# Patient Record
Sex: MALE | Race: BLACK OR AFRICAN AMERICAN | NOT HISPANIC OR LATINO | Employment: UNEMPLOYED | URBAN - METROPOLITAN AREA
[De-identification: names, ages, dates, MRNs, and addresses within clinical notes are randomized per-mention and may not be internally consistent; named-entity substitution may affect disease eponyms.]

---

## 2022-07-05 ENCOUNTER — HOSPITAL ENCOUNTER (EMERGENCY)
Facility: HOSPITAL | Age: 32
Discharge: HOME | End: 2022-07-05
Attending: EMERGENCY MEDICINE
Payer: MEDICAID

## 2022-07-05 VITALS
BODY MASS INDEX: 30.9 KG/M2 | HEART RATE: 87 BPM | TEMPERATURE: 98 F | OXYGEN SATURATION: 99 % | SYSTOLIC BLOOD PRESSURE: 135 MMHG | WEIGHT: 191.5 LBS | RESPIRATION RATE: 16 BRPM | DIASTOLIC BLOOD PRESSURE: 68 MMHG

## 2022-07-05 DIAGNOSIS — Z20.2 STD EXPOSURE: Primary | ICD-10-CM

## 2022-07-05 LAB
HCV AB SERPL QL IA: NEGATIVE
HEP C VIRUS HOLD SPECIMEN: NORMAL
HIV 1+2 AB+HIV1 P24 AG SERPL QL IA: NEGATIVE

## 2022-07-05 PROCEDURE — 87591 N.GONORRHOEAE DNA AMP PROB: CPT | Performed by: EMERGENCY MEDICINE

## 2022-07-05 PROCEDURE — 87491 CHLMYD TRACH DNA AMP PROBE: CPT | Performed by: EMERGENCY MEDICINE

## 2022-07-05 PROCEDURE — 86803 HEPATITIS C AB TEST: CPT | Performed by: EMERGENCY MEDICINE

## 2022-07-05 PROCEDURE — 87389 HIV-1 AG W/HIV-1&-2 AB AG IA: CPT | Performed by: EMERGENCY MEDICINE

## 2022-07-05 PROCEDURE — 99283 EMERGENCY DEPT VISIT LOW MDM: CPT | Mod: 25,ER

## 2022-07-05 NOTE — ED PROVIDER NOTES
"Encounter Date: 7/5/2022       History     Chief Complaint   Patient presents with    Exposure to STD     "Just want checked for STD's" denies exposure, denies symptoms     The history is provided by the patient.   Exposure to STD  This is a new problem. The current episode started more than 1 week ago. The problem has not changed since onset.Pertinent negatives include no chest pain, no abdominal pain, no headaches and no shortness of breath. Nothing aggravates the symptoms. Nothing relieves the symptoms.     Review of patient's allergies indicates:  No Known Allergies  No past medical history on file.  No past surgical history on file.  No family history on file.  Social History     Tobacco Use    Smoking status: Current Every Day Smoker     Packs/day: 0.50     Types: Cigars   Substance Use Topics    Alcohol use: No    Drug use: No     Review of Systems   Constitutional: Negative for fever.   HENT: Negative for sore throat.    Respiratory: Negative for shortness of breath.    Cardiovascular: Negative for chest pain.   Gastrointestinal: Negative for abdominal pain and nausea.   Genitourinary: Negative for dysuria.   Musculoskeletal: Negative for back pain.   Skin: Negative for rash.   Neurological: Negative for weakness and headaches.   Hematological: Does not bruise/bleed easily.       Physical Exam     Initial Vitals [07/05/22 1038]   BP Pulse Resp Temp SpO2   135/68 87 16 98.2 °F (36.8 °C) 99 %      MAP       --         Physical Exam    Nursing note and vitals reviewed.  Constitutional: He appears well-developed and well-nourished. No distress.   HENT:   Head: Normocephalic and atraumatic.   Mouth/Throat: Oropharynx is clear and moist.   Eyes: Conjunctivae and EOM are normal. Pupils are equal, round, and reactive to light.   Neck: Neck supple.   Normal range of motion.  Cardiovascular: Normal rate, regular rhythm and normal heart sounds. Exam reveals no gallop and no friction rub.    No murmur " heard.  Pulmonary/Chest: Breath sounds normal. No respiratory distress. He has no wheezes. He has no rhonchi. He has no rales.   Abdominal: Abdomen is soft. Bowel sounds are normal. He exhibits no distension and no mass. There is no abdominal tenderness. There is no rebound and no guarding.   Musculoskeletal:         General: No edema. Normal range of motion.      Cervical back: Normal range of motion and neck supple.     Neurological: He is alert and oriented to person, place, and time. He has normal strength.   Skin: Skin is warm and dry. No rash noted.   Psychiatric: He has a normal mood and affect. Thought content normal.         ED Course   Procedures  Labs Reviewed   C. TRACHOMATIS/N. GONORRHOEAE BY AMP DNA   HIV 1 / 2 ANTIBODY   HEPATITIS C ANTIBODY   HEP C VIRUS HOLD SPECIMEN          Imaging Results    None          Medications - No data to display                       Clinical Impression:   Final diagnoses:  [Z20.2] STD exposure (Primary)          ED Disposition Condition    Discharge Stable        ED Prescriptions     None        Follow-up Information     Follow up With Specialties Details Why Contact [facility]  Call in 1 day  [address]  [place] [address]  [phone]             Cholo Tirado MD  07/05/22 7158

## 2022-07-06 LAB
C TRACH DNA SPEC QL NAA+PROBE: DETECTED
N GONORRHOEA DNA SPEC QL NAA+PROBE: NOT DETECTED

## 2022-07-08 ENCOUNTER — TELEPHONE (OUTPATIENT)
Dept: EMERGENCY MEDICINE | Facility: HOSPITAL | Age: 32
End: 2022-07-08
Payer: MEDICAID

## 2022-07-08 NOTE — TELEPHONE ENCOUNTER
----- Message from Levi Montanez MD sent at 7/7/2022  4:04 AM CDT -----  This patient tested positive for chlamydia. Please call patient and inform him. Please call in a prescription to the pharmacy of his choice. Prescription for 100 mg doxycyline BID x 7 days. 14 tabs. 0 refills. He should notify his sexual partners and have them tested/treated as well

## 2025-07-19 ENCOUNTER — HOSPITAL ENCOUNTER (EMERGENCY)
Facility: HOSPITAL | Age: 35
Discharge: HOME | End: 2025-07-19
Attending: EMERGENCY MEDICINE

## 2025-07-19 VITALS
HEART RATE: 89 BPM | WEIGHT: 195.13 LBS | BODY MASS INDEX: 30.63 KG/M2 | RESPIRATION RATE: 18 BRPM | OXYGEN SATURATION: 98 % | TEMPERATURE: 99 F | DIASTOLIC BLOOD PRESSURE: 69 MMHG | HEIGHT: 67 IN | SYSTOLIC BLOOD PRESSURE: 133 MMHG

## 2025-07-19 DIAGNOSIS — N45.1 EPIDIDYMITIS, LEFT: Primary | ICD-10-CM

## 2025-07-19 DIAGNOSIS — N50.812 LEFT TESTICULAR PAIN: ICD-10-CM

## 2025-07-19 DIAGNOSIS — N50.812 PAIN IN LEFT TESTICLE: ICD-10-CM

## 2025-07-19 LAB
BILIRUB UR QL STRIP.AUTO: NEGATIVE
CLARITY UR: CLEAR
COLOR UR AUTO: YELLOW
GLUCOSE UR QL STRIP: NEGATIVE
HGB UR QL STRIP: NEGATIVE
KETONES UR QL STRIP: NEGATIVE
LEUKOCYTE ESTERASE UR QL STRIP: NEGATIVE
NITRITE UR QL STRIP: NEGATIVE
PH UR STRIP: 7 [PH]
PROT UR QL STRIP: ABNORMAL
SP GR UR STRIP: 1.02
UROBILINOGEN UR STRIP-ACNC: 1 EU/DL

## 2025-07-19 PROCEDURE — 81003 URINALYSIS AUTO W/O SCOPE: CPT | Mod: ER | Performed by: EMERGENCY MEDICINE

## 2025-07-19 PROCEDURE — 87591 N.GONORRHOEAE DNA AMP PROB: CPT | Performed by: EMERGENCY MEDICINE

## 2025-07-19 PROCEDURE — 99285 EMERGENCY DEPT VISIT HI MDM: CPT | Mod: 25,ER

## 2025-07-19 RX ORDER — CIPROFLOXACIN 500 MG/1
500 TABLET, FILM COATED ORAL 2 TIMES DAILY
Qty: 20 TABLET | Refills: 0 | Status: SHIPPED | OUTPATIENT
Start: 2025-07-19 | End: 2025-07-29

## 2025-07-19 NOTE — Clinical Note
"Belkis Randolph (Macarthur)erson was seen and treated in our emergency department on 7/19/2025.  He may return to work on 07/21/2025.       If you have any questions or concerns, please don't hesitate to call.       RN    "

## 2025-07-19 NOTE — ED PROVIDER NOTES
SCRIBE #1 NOTE: I, Joni Gutierrez, am scribing for, and in the presence of, Ann Heller MD. I have scribed the entire note.       History     Chief Complaint   Patient presents with    Testicle Pain     Swelling and pain to left testicle that began 2 days ago.     Review of patient's allergies indicates:  No Known Allergies      History of Present Illness     HPI    7/19/2025, 5:25 PM  History obtained from the patient and medical records      History of Present Illness: Belkis Gamez Jr. is a 34 y.o. male patient who presents to the Emergency Department for evaluation of swelling and pain to left testicle which onset two days ago. Symptoms are constant and moderate in severity. No mitigating or exacerbating factors reported. Patient denies any fever, dysuria, SOB, CP, and all other sxs at this time. No further complaints or concerns at this time.       Arrival mode: Personal Transportation    PCP: Monae, Primary Doctor        Past Medical History:  History reviewed. No pertinent past medical history.    Past Surgical History:  History reviewed. No pertinent surgical history.      Family History:  No family history on file.    Social History:  Social History     Tobacco Use    Smoking status: Every Day     Current packs/day: 0.50     Types: Cigars, Cigarettes    Smokeless tobacco: Not on file   Substance and Sexual Activity    Alcohol use: No    Drug use: No    Sexual activity: Yes     Partners: Female        Review of Systems     Review of Systems   Constitutional:  Negative for chills and fever.   HENT:  Negative for congestion and sore throat.    Respiratory:  Negative for cough and shortness of breath.    Cardiovascular:  Negative for chest pain.   Gastrointestinal:  Negative for abdominal pain, nausea and vomiting.   Genitourinary:  Positive for scrotal swelling (left-sided) and testicular pain (left-sided). Negative for dysuria.   Musculoskeletal:  Negative for back pain.   Skin:  Negative for rash.  "  Neurological:  Negative for dizziness, weakness, light-headedness, numbness and headaches.   Hematological:  Does not bruise/bleed easily.   All other systems reviewed and are negative.       Physical Exam     Initial Vitals [07/19/25 1501]   BP Pulse Resp Temp SpO2   133/69 89 18 98.6 °F (37 °C) 98 %      MAP       --          Physical Exam  Nursing Notes and Vital Signs Reviewed.  Constitutional: Patient is in no acute distress. Well-developed and well-nourished.  Head: Atraumatic. Normocephalic.  Eyes: PERRL. EOM intact. Conjunctivae are not pale. No scleral icterus.  ENT: Mucous membranes are moist. Oropharynx is clear and symmetric.    Neck: Supple. Full ROM. No lymphadenopathy.  Cardiovascular: Regular rate. Regular rhythm. No murmurs, rubs, or gallops. Distal pulses are 2+ and symmetric.  Pulmonary/Chest: No respiratory distress. Clear to auscultation bilaterally. No wheezing or rales.  Abdominal: Soft and non-distended.  There is no tenderness.  No rebound, guarding, or rigidity. Good bowel sounds.  Genitourinary: No CVA tenderness. Swelling and tenderness to left testicle.  Musculoskeletal: Moves all extremities. No obvious deformities. No edema. No calf tenderness.  Skin: Warm and dry.  Neurological:  Alert, awake, and appropriate.  Normal speech.  No acute focal neurological deficits are appreciated.  Psychiatric: Normal affect. Good eye contact. Appropriate in content.       ED Course   Procedures  ED Vital Signs:  Vitals:    07/19/25 1501   BP: 133/69   Pulse: 89   Resp: 18   Temp: 98.6 °F (37 °C)   TempSrc: Oral   SpO2: 98%   Weight: 88.5 kg (195 lb 1.7 oz)   Height: 5' 7" (1.702 m)       Abnormal Lab Results:  Labs Reviewed   URINALYSIS, REFLEX TO URINE CULTURE - Abnormal       Result Value    Color, UA Yellow      Appearance, UA Clear      pH, UA 7.0      Spec Grav UA 1.020      Protein, UA Trace (*)     Glucose, UA Negative      Ketones, UA Negative      Bilirubin, UA Negative      Blood, UA " Negative      Nitrites, UA Negative      Urobilinogen, UA 1.0      Leukocyte Esterase, UA Negative     HEPATITIS C ANTIBODY   HEP C VIRUS HOLD SPECIMEN   HIV 1 / 2 ANTIBODY   C. TRACHOMATIS/N. GONORRHOEAE BY AMP DNA   [place] URINE HOLD        All Lab Results:  Results for orders placed or performed during the hospital encounter of 07/19/25   Urinalysis, Reflex to Urine Culture Urine, Clean Catch    Collection Time: 07/19/25  3:31 PM    Specimen: Urine   Result Value Ref Range    Color, UA Yellow Straw, Marleni, Yellow, Light-Orange    Appearance, UA Clear Clear    pH, UA 7.0 5.0 - 8.0    Spec Grav UA 1.020 1.005 - 1.030    Protein, UA Trace (A) Negative    Glucose, UA Negative Negative    Ketones, UA Negative Negative    Bilirubin, UA Negative Negative    Blood, UA Negative Negative    Nitrites, UA Negative Negative    Urobilinogen, UA 1.0 <2.0 EU/dL    Leukocyte Esterase, UA Negative Negative       Imaging Results:  Imaging Results              US Scrotum And Testicles (Final result)  Result time 07/19/25 17:51:32      Final result by Adriano Martines DO (07/19/25 17:51:32)                   Impression:     Left epididymitis without orchitis.      Finalized on: 7/19/2025 5:51 PM By:  Adriano Martines  [facility]# [id number]      2025-07-19 17:53:39.209     [facility]               Narrative:    EXAM: US SCROTUM AND TESTICLES    CLINICAL HISTORY:   Left testicular pain    TECHNIQUE: Routine testicular ultrasound using color and spectral Doppler.    FINDINGS:    Right testicle: 4.2 x 2.2 x 2.9 cm.  No testicular masses.  Normal arterial and vascular flow.    Left testicle: 3.7 x 2.7 x 2.8 cm.  No testicular masses.  Normal arterial and vascular flow.    Epididymis: Epididymis on the left is mildly edematous with increased vascularity consistent with epididymitis.  Unremarkable on the right.    Hydroceles:  Small hydrocele on the left.    Varicoceles: None    Scrotal skin: Unremarkable                                                   The Emergency Provider reviewed the vital signs and test results, which are outlined above.     ED Discussion         6:04 PM : Reassessed pt at this time. Discussed with patient and/or family/caretaker all pertinent ED information and results. Discussed pt dx and plan of tx. Gave the patient all f/u and return to the ED instructions. All questions and concerns were addressed at this time. Patient and/or family/caretaker expresses understanding of information and instructions, and is comfortable with plan to discharge. Pt is stable for discharge.     I discussed with patient and/or family/caretaker that evaluation in the ED does not suggest any emergent or life threatening medical conditions requiring immediate intervention beyond what was provided in the ED, and I believe patient is safe for discharge. Regardless, an unremarkable evaluation in the ED does not preclude the development or presence of a serious or life threatening condition. As such, I instructed that the patient is to return immediately for any worsening or change in current symptoms.         Medical Decision Making  Amount and/or Complexity of Data Reviewed  Labs: ordered. Decision-making details documented in ED Course.  Radiology: ordered and independent interpretation performed. Decision-making details documented in ED Course.    Risk  Prescription drug management.                ED Medication(s):  Medications - No data to display    New Prescriptions    CIPROFLOXACIN HCL (CIPRO) 500 MG TABLET    Take 1 tablet (500 mg total) by mouth 2 (two) times daily. for 10 days               Scribe Attestation:   Scribe #1: I performed the above scribed service and the documentation accurately describes the services I performed. I attest to the accuracy of the note.     Attending:   Physician Attestation Statement for Scribe #1: I, Ann Heller MD, personally performed the services described in this documentation, as scribed by Joni Gutierrez in  my presence, and it is both accurate and complete.           Clinical Impression       ICD-10-CM ICD-9-CM   1. Epididymitis, left  N45.1 604.90   2. Left testicular pain  N50.812 608.9   3. Pain in left testicle  N50.812 608.9       Disposition:   Disposition: Discharged  Condition: Stable

## 2025-07-19 NOTE — ED PROVIDER NOTES
Encounter Date: 7/19/2025       History     Chief Complaint   Patient presents with    Testicle Pain     Swelling and pain to left testicle that began 2 days ago.     Left testicular pain and swelling over the last 2 or 3 days, possibly onset with minor contusion at work, persistent, worsening, no prior such episode.  No urinary symptoms, no personal history of STD, girlfriend present and without complaints or history of STD.  No urethral discharge, skin lesions, or problems involving the right side.    The history is provided by the patient and a significant other. No  was used.     Review of patient's allergies indicates:  No Known Allergies  History reviewed. No pertinent past medical history.  History reviewed. No pertinent surgical history.  No family history on file.  Social History[1]  Review of Systems   Genitourinary:  Positive for scrotal swelling and testicular pain. Negative for decreased urine volume, difficulty urinating, dysuria, enuresis, flank pain, frequency, genital sores, hematuria, penile discharge, penile pain, penile swelling and urgency.       Physical Exam     Initial Vitals [07/19/25 1501]   BP Pulse Resp Temp SpO2   133/69 89 18 98.6 °F (37 °C) 98 %      MAP       --         Physical Exam    Nursing note and vitals reviewed.  Constitutional: He appears well-developed and well-nourished. No distress.   HENT:   Head: Normocephalic and atraumatic.   Eyes: EOM are normal. Pupils are equal, round, and reactive to light.   Cardiovascular:  Normal rate and regular rhythm.           Pulmonary/Chest: Breath sounds normal. No respiratory distress.   Abdominal: Abdomen is soft. Bowel sounds are normal. He exhibits no distension. There is no abdominal tenderness. There is no rebound and no guarding.   Genitourinary:    Genitourinary Comments: Circumcised normal male anatomy without discharge or lesion.  Right testicle and hemiscrotum unremarkable.  Left testicle significantly  enlarged, tender, firm.  Exam most suggestive of epididymitis.  No other findings.       Neurological: He is alert and oriented to person, place, and time. He has normal strength.         ED Course   Procedures  Labs Reviewed   HEPATITIS C ANTIBODY   HEP C VIRUS HOLD SPECIMEN   HIV 1 / 2 ANTIBODY   URINALYSIS, REFLEX TO URINE CULTURE   C. TRACHOMATIS/N. GONORRHOEAE BY AMP DNA          Imaging Results    None          Medications - No data to display    3:33 PM Counseled patient and significant other in detail regarding the differential, likely current diagnosis, unavailability of ultrasound at this facility.  Placed NPO and send urgently to the [place] facility for ultrasound to rule out testicular torsion although clinically epididymitis is more likely and should torsion be present the likelihood testicular salvage is extremely low.  He understands.  Competent to drive himself by private vehicle for this evaluation.    Medical Decision Making  Two or 3 days of left testicular pain, swelling, enlargement, tenderness.  Exam most likely consistent with epididymitis but can not rule out torsion.  Transferring by private vehicle to [facility] for availability of ultrasound.    Problems Addressed:  Left testicular pain: acute illness or injury    Amount and/or Complexity of Data Reviewed  Labs: ordered. Decision-making details documented in ED Course.  Radiology:  Decision-making details documented in ED Course.    Risk  Decision regarding hospitalization.      Additional MDM:   Differential Diagnosis:   Epididymitis, epididymo-orchitis, testicular torsion, other causes of testicular pain and swelling                                        Clinical Impression:  Final diagnoses:  [N50.812] Left testicular pain (Primary)          ED Disposition Condition    ED to ED Transfer Stable                      [1]   Social History  Tobacco Use    Smoking status: Every Day     Current packs/day: 0.50     Types: Cigars,  Cigarettes   Substance Use Topics    Alcohol use: No    Drug use: No        Brian Kevin MD  07/19/25 7368

## 2025-07-20 LAB — HOLD SPECIMEN: NORMAL

## 2025-07-21 ENCOUNTER — HOSPITAL ENCOUNTER (EMERGENCY)
Facility: HOSPITAL | Age: 35
Discharge: HOME | End: 2025-07-21
Attending: EMERGENCY MEDICINE

## 2025-07-21 VITALS
WEIGHT: 195.13 LBS | HEART RATE: 82 BPM | DIASTOLIC BLOOD PRESSURE: 79 MMHG | BODY MASS INDEX: 30.56 KG/M2 | TEMPERATURE: 98 F | SYSTOLIC BLOOD PRESSURE: 132 MMHG | RESPIRATION RATE: 18 BRPM

## 2025-07-21 DIAGNOSIS — N45.1 EPIDIDYMITIS: Primary | ICD-10-CM

## 2025-07-21 PROCEDURE — 96372 THER/PROPH/DIAG INJ SC/IM: CPT | Performed by: PHYSICIAN ASSISTANT

## 2025-07-21 PROCEDURE — 99284 EMERGENCY DEPT VISIT MOD MDM: CPT | Mod: 25,ER

## 2025-07-21 PROCEDURE — 63600175 PHARM REV CODE 636 W HCPCS: Mod: JZ,TB,ER | Performed by: PHYSICIAN ASSISTANT

## 2025-07-21 RX ORDER — CEFTRIAXONE 500 MG/1
500 INJECTION, POWDER, FOR SOLUTION INTRAMUSCULAR; INTRAVENOUS
Status: COMPLETED | OUTPATIENT
Start: 2025-07-21 | End: 2025-07-21

## 2025-07-21 RX ORDER — KETOROLAC TROMETHAMINE 30 MG/ML
15 INJECTION, SOLUTION INTRAMUSCULAR; INTRAVENOUS
Status: COMPLETED | OUTPATIENT
Start: 2025-07-21 | End: 2025-07-21

## 2025-07-21 RX ORDER — DOXYCYCLINE 100 MG/1
100 CAPSULE ORAL 2 TIMES DAILY
Qty: 14 CAPSULE | Refills: 0 | Status: SHIPPED | OUTPATIENT
Start: 2025-07-21 | End: 2025-07-28

## 2025-07-21 RX ADMIN — CEFTRIAXONE SODIUM 500 MG: 500 INJECTION, POWDER, FOR SOLUTION INTRAMUSCULAR; INTRAVENOUS at 05:07

## 2025-07-21 RX ADMIN — KETOROLAC TROMETHAMINE 15 MG: 30 INJECTION, SOLUTION INTRAMUSCULAR at 05:07

## 2025-07-21 NOTE — Clinical Note
"Belkis Murdock" Franco was seen and treated in our emergency department on 7/21/2025.  He may return to work on 07/23/2025.       If you have any questions or concerns, please don't hesitate to call.           "

## 2025-07-21 NOTE — Clinical Note
"Belkis Randolph (Macarthur)erson was seen and treated in our emergency department on 7/21/2025.  He may return to work on 07/28/2025.       If you have any questions or concerns, please don't hesitate to call.       RN    "

## 2025-07-22 LAB
C TRACH DNA SPEC QL NAA+PROBE: NOT DETECTED
CTGC SOURCE (OHS) ORD-325: NORMAL
N GONORRHOEA DNA UR QL NAA+PROBE: NOT DETECTED

## 2025-07-23 NOTE — ED PROVIDER NOTES
History      Chief Complaint   Patient presents with    Testicle Pain     Currently on abx for antibiotics. Still having pain with walking and standing but admits some improvement in pain when lying down. Concerned that dose on antibiotics is low.        Review of patient's allergies indicates:  No Known Allergies     HPI   HPI    7/23/2025, 12:07 PM   History obtained from the patient      History of Present Illness: Belkis Gamez Jr. is a 34 y.o. male patient who presents to the Emergency Department for continued pain from epididymitis, requests antibiotics shot.    No further complaints or concerns at this time.           PCP: No, Primary Doctor       Past Medical History:  History reviewed. No pertinent past medical history.      Past Surgical History:  History reviewed. No pertinent surgical history.        Family History:  No family history on file.        Social History:  Social History     Tobacco Use    Smoking status: Every Day     Current packs/day: 0.50     Types: Cigars, Cigarettes    Smokeless tobacco: Not on file   Substance and Sexual Activity    Alcohol use: No    Drug use: No    Sexual activity: Yes     Partners: Female       ROS     Review of Systems   Constitutional:  Negative for fever.   Genitourinary:  Positive for testicular pain. Negative for penile discharge.       Physical Exam      Initial Vitals [07/21/25 1711]   BP Pulse Resp Temp SpO2   132/79 82 18 98.2 °F (36.8 °C) --      MAP       --         Physical Exam  Vital signs and nursing notes reviewed.  Constitutional: Patient is in NAD. Awake and alert. Well-developed and well-nourished.  Head: Atraumatic. Normocephalic.  Eyes:  EOM intact. Conjunctivae nl. No scleral icterus.  ENT: Mucous membranes are moist.   Neck: Supple.  No meningismus  Cardiovascular: Regular rate and rhythm. No murmurs, rubs, or gallops.   Pulmonary/Chest: No respiratory distress. Clear to auscultation bilaterally. No wheezing, rales, or  rhonchi.  Abdominal: Soft. Non-distended. No TTP. No rebound, guarding, or rigidity. Good bowel sounds.  Genitourinary: No CVA tenderness  Musculoskeletal: Moves all extremities. No edema.   Skin: Warm and dry.  Neurological: Awake and alert. No acute focal neurological deficits are appreciated.  Psychiatric: Normal affect. Good eye contact. Appropriate in content.      ED Course          Procedures  ED Vital Signs:  Vitals:    07/21/25 1711 07/21/25 1712   BP: 132/79    Pulse: 82    Resp: 18    Temp: 98.2 °F (36.8 °C)    TempSrc: Oral Oral   Weight:  88.5 kg (195 lb 1.7 oz)                 Imaging Results:  Imaging Results    None            The Emergency Provider reviewed the vital signs and test results, which are outlined above.    ED Discussion             Medication(s) given in the ER:  Medications   cefTRIAXone injection 500 mg (500 mg Intramuscular Given 7/21/25 1744)   ketorolac injection 15 mg (15 mg Intramuscular Given 7/21/25 1743)            Follow-up Information       Clinic, [facility] In 2 days.    Contact information:  [address]  [place] [address] [phone]               [facility] In 2 days.    Contact information:  [address]  [place] [address] [phone]                                    Medication List        START taking these medications      doxycycline 100 MG Cap  Commonly known as: VIBRAMYCIN  Take 1 capsule (100 mg total) by mouth 2 (two) times daily. for 7 days            ASK your doctor about these medications      ciprofloxacin HCl 500 MG tablet  Commonly known as: CIPRO  Take 1 tablet (500 mg total) by mouth 2 (two) times daily. for 10 days     dicyclomine 20 mg tablet  Commonly known as: BENTYL  Take 1 tablet (20 mg total) by mouth 2 (two) times daily as needed (abdominal cramping).     ondansetron 4 MG Tbdl  Commonly known as: ZOFRAN-ODT  Take 1 tablet (4 mg total) by mouth every 8 (eight) hours as needed.               Where to Get  Your Medications        These medications were sent to [id number] - [place] - [address]  [address] [address]      Phone: [phone]   doxycycline 100 MG Cap             Medical Decision Making        All findings were reviewed with the patient/family in detail.   All remaining questions and concerns were addressed at that time.  Patient/family has been counseled regarding the need for follow-up as well as the indication to return to the emergency room should new or worrisome developments occur.        MDM                 Clinical Impression:        ICD-10-CM ICD-9-CM   1. Epididymitis  N45.1 604.90               Deja Nazario, PAPAVAN  07/23/25 1206